# Patient Record
Sex: FEMALE
[De-identification: names, ages, dates, MRNs, and addresses within clinical notes are randomized per-mention and may not be internally consistent; named-entity substitution may affect disease eponyms.]

---

## 2017-02-06 ENCOUNTER — APPOINTMENT (OUTPATIENT)
Dept: PULMONOLOGY | Facility: CLINIC | Age: 77
End: 2017-02-06

## 2017-02-07 VITALS
DIASTOLIC BLOOD PRESSURE: 76 MMHG | WEIGHT: 175 LBS | RESPIRATION RATE: 16 BRPM | SYSTOLIC BLOOD PRESSURE: 120 MMHG | BODY MASS INDEX: 34.36 KG/M2 | HEART RATE: 78 BPM | OXYGEN SATURATION: 94 % | HEIGHT: 60 IN

## 2017-08-14 ENCOUNTER — APPOINTMENT (OUTPATIENT)
Dept: PULMONOLOGY | Facility: CLINIC | Age: 77
End: 2017-08-14

## 2019-06-11 ENCOUNTER — RX RENEWAL (OUTPATIENT)
Age: 79
End: 2019-06-11

## 2019-06-11 RX ORDER — ATORVASTATIN CALCIUM 10 MG/1
10 TABLET, FILM COATED ORAL
Qty: 30 | Refills: 0 | Status: ACTIVE | COMMUNITY
Start: 2019-06-11 | End: 1900-01-01

## 2019-06-11 RX ORDER — PANTOPRAZOLE 40 MG/1
40 TABLET, DELAYED RELEASE ORAL
Qty: 30 | Refills: 0 | Status: ACTIVE | COMMUNITY
Start: 2019-06-11 | End: 1900-01-01

## 2019-07-29 ENCOUNTER — NON-APPOINTMENT (OUTPATIENT)
Age: 79
End: 2019-07-29

## 2019-07-29 ENCOUNTER — APPOINTMENT (OUTPATIENT)
Dept: INTERNAL MEDICINE | Facility: CLINIC | Age: 79
End: 2019-07-29
Payer: MEDICARE

## 2019-07-29 VITALS
DIASTOLIC BLOOD PRESSURE: 80 MMHG | SYSTOLIC BLOOD PRESSURE: 130 MMHG | TEMPERATURE: 98.3 F | RESPIRATION RATE: 14 BRPM | BODY MASS INDEX: 34.55 KG/M2 | HEART RATE: 126 BPM | OXYGEN SATURATION: 96 % | WEIGHT: 176 LBS | HEIGHT: 60 IN

## 2019-07-29 DIAGNOSIS — Z00.00 ENCOUNTER FOR GENERAL ADULT MEDICAL EXAMINATION W/OUT ABNORMAL FINDINGS: ICD-10-CM

## 2019-07-29 PROCEDURE — 36415 COLL VENOUS BLD VENIPUNCTURE: CPT

## 2019-07-29 PROCEDURE — G0439: CPT

## 2019-07-29 PROCEDURE — 93000 ELECTROCARDIOGRAM COMPLETE: CPT

## 2019-07-29 RX ORDER — CHLORHEXIDINE GLUCONATE 4 %
325 (65 FE) LIQUID (ML) TOPICAL TWICE DAILY
Qty: 60 | Refills: 3 | Status: ACTIVE | COMMUNITY
Start: 2019-07-29 | End: 1900-01-01

## 2019-07-29 RX ORDER — ARIPIPRAZOLE 10 MG/1
10 TABLET ORAL DAILY
Qty: 30 | Refills: 3 | Status: ACTIVE | COMMUNITY
Start: 2019-07-29 | End: 1900-01-01

## 2019-07-29 RX ORDER — ALBUTEROL SULFATE 90 UG/1
108 (90 BASE) AEROSOL, METERED RESPIRATORY (INHALATION)
Qty: 1 | Refills: 3 | Status: ACTIVE | COMMUNITY
Start: 2019-07-29 | End: 1900-01-01

## 2019-07-30 LAB
ALBUMIN SERPL ELPH-MCNC: 4.2 G/DL
ALP BLD-CCNC: 97 U/L
ALT SERPL-CCNC: 10 U/L
ANION GAP SERPL CALC-SCNC: 13 MMOL/L
APPEARANCE: ABNORMAL
AST SERPL-CCNC: 13 U/L
BACTERIA: ABNORMAL
BILIRUB SERPL-MCNC: 0.3 MG/DL
BILIRUBIN URINE: NEGATIVE
BLOOD URINE: NEGATIVE
BUN SERPL-MCNC: 20 MG/DL
CALCIUM SERPL-MCNC: 9.8 MG/DL
CHLORIDE SERPL-SCNC: 107 MMOL/L
CHOLEST SERPL-MCNC: 185 MG/DL
CHOLEST/HDLC SERPL: 3.9 RATIO
CO2 SERPL-SCNC: 25 MMOL/L
COLOR: YELLOW
CREAT SERPL-MCNC: 1.44 MG/DL
ESTIMATED AVERAGE GLUCOSE: 128 MG/DL
GLUCOSE QUALITATIVE U: NEGATIVE
GLUCOSE SERPL-MCNC: 135 MG/DL
HBA1C MFR BLD HPLC: 6.1 %
HDLC SERPL-MCNC: 47 MG/DL
HYALINE CASTS: 0 /LPF
KETONES URINE: NEGATIVE
LDLC SERPL CALC-MCNC: 93 MG/DL
LEUKOCYTE ESTERASE URINE: ABNORMAL
LITHIUM SERPL-SCNC: <0.2 MMOL/L
MICROSCOPIC-UA: NORMAL
NITRITE URINE: NEGATIVE
PH URINE: 5.5
POTASSIUM SERPL-SCNC: 5.1 MMOL/L
PROT SERPL-MCNC: 7.3 G/DL
PROTEIN URINE: ABNORMAL
RED BLOOD CELLS URINE: 7 /HPF
SODIUM SERPL-SCNC: 145 MMOL/L
SPECIFIC GRAVITY URINE: 1.02
SQUAMOUS EPITHELIAL CELLS: 4 /HPF
T3 SERPL-MCNC: 122 NG/DL
T4 FREE SERPL-MCNC: 1 NG/DL
T4 SERPL-MCNC: 6.6 UG/DL
TRIGL SERPL-MCNC: 227 MG/DL
TSH SERPL-ACNC: 2.93 UIU/ML
UROBILINOGEN URINE: NORMAL
WHITE BLOOD CELLS URINE: 62 /HPF

## 2019-08-03 NOTE — PHYSICAL EXAM
[No Acute Distress] : no acute distress [Well Nourished] : well nourished [Well Developed] : well developed [Well-Appearing] : well-appearing [Normal Sclera/Conjunctiva] : normal sclera/conjunctiva [PERRL] : pupils equal round and reactive to light [EOMI] : extraocular movements intact [Normal Outer Ear/Nose] : the outer ears and nose were normal in appearance [Normal Oropharynx] : the oropharynx was normal [No JVD] : no jugular venous distention [No Lymphadenopathy] : no lymphadenopathy [Supple] : supple [Thyroid Normal, No Nodules] : the thyroid was normal and there were no nodules present [No Respiratory Distress] : no respiratory distress  [No Accessory Muscle Use] : no accessory muscle use [Clear to Auscultation] : lungs were clear to auscultation bilaterally [Normal Rate] : normal rate  [Regular Rhythm] : with a regular rhythm [Normal S1, S2] : normal S1 and S2 [No Murmur] : no murmur heard [No Carotid Bruits] : no carotid bruits [No Abdominal Bruit] : a ~M bruit was not heard ~T in the abdomen [No Varicosities] : no varicosities [Pedal Pulses Present] : the pedal pulses are present [No Edema] : there was no peripheral edema [No Palpable Aorta] : no palpable aorta [No Extremity Clubbing/Cyanosis] : no extremity clubbing/cyanosis [Normal Appearance] : normal in appearance [No Nipple Discharge] : no nipple discharge [No Axillary Lymphadenopathy] : no axillary lymphadenopathy [Soft] : abdomen soft [Non Tender] : non-tender [Non-distended] : non-distended [No Masses] : no abdominal mass palpated [No HSM] : no HSM [Normal Bowel Sounds] : normal bowel sounds [Normal Posterior Cervical Nodes] : no posterior cervical lymphadenopathy [Normal Anterior Cervical Nodes] : no anterior cervical lymphadenopathy [No CVA Tenderness] : no CVA  tenderness [No Spinal Tenderness] : no spinal tenderness [No Joint Swelling] : no joint swelling [Grossly Normal Strength/Tone] : grossly normal strength/tone [No Rash] : no rash [Coordination Grossly Intact] : coordination grossly intact [No Focal Deficits] : no focal deficits [Normal Gait] : normal gait [Deep Tendon Reflexes (DTR)] : deep tendon reflexes were 2+ and symmetric [Normal Affect] : the affect was normal [Normal Insight/Judgement] : insight and judgment were intact [FreeTextEntry1] : DEFERRED

## 2019-08-03 NOTE — HEALTH RISK ASSESSMENT
[Fair] :  ~his/her~ mood as fair [No] : In the past 12 months have you used drugs other than those required for medical reasons? No [No falls in past year] : Patient reported no falls in the past year [1] : 1) Little interest or pleasure doing things for several days (1) [0] : 2) Feeling down, depressed, or hopeless: Not at all (0) [] : No

## 2019-08-03 NOTE — HISTORY OF PRESENT ILLNESS
[FreeTextEntry1] : PHYSICAL\par NO SPECIFIC COMPLAINTS [de-identified] : 79 YO WF WITH HX OF  CAD, HTN, HYPERLIPIDEMIA, ASTHMA, DEPRESSION CAME TO THE OFFICE FOR PHYSICAL

## 2019-08-09 ENCOUNTER — OTHER (OUTPATIENT)
Age: 79
End: 2019-08-09

## 2019-08-09 DIAGNOSIS — R82.90 UNSPECIFIED ABNORMAL FINDINGS IN URINE: ICD-10-CM

## 2019-08-09 LAB
BASOPHILS # BLD AUTO: 0.07 K/UL
BASOPHILS NFR BLD AUTO: 0.7 %
EOSINOPHIL # BLD AUTO: 0.25 K/UL
EOSINOPHIL NFR BLD AUTO: 2.7 %
HCT VFR BLD CALC: 43.8 %
HGB BLD-MCNC: 13 G/DL
IMM GRANULOCYTES NFR BLD AUTO: 0.4 %
LYMPHOCYTES # BLD AUTO: 1.73 K/UL
LYMPHOCYTES NFR BLD AUTO: 18.3 %
MAN DIFF?: NORMAL
MCHC RBC-ENTMCNC: 27.4 PG
MCHC RBC-ENTMCNC: 29.7 GM/DL
MCV RBC AUTO: 92.4 FL
MONOCYTES # BLD AUTO: 0.48 K/UL
MONOCYTES NFR BLD AUTO: 5.1 %
NEUTROPHILS # BLD AUTO: 6.86 K/UL
NEUTROPHILS NFR BLD AUTO: 72.8 %
PLATELET # BLD AUTO: 283 K/UL
RBC # BLD: 4.74 M/UL
RBC # FLD: 14.8 %
WBC # FLD AUTO: 9.43 K/UL

## 2019-08-15 ENCOUNTER — OTHER (OUTPATIENT)
Age: 79
End: 2019-08-15

## 2019-08-15 DIAGNOSIS — Z87.440 PERSONAL HISTORY OF URINARY (TRACT) INFECTIONS: ICD-10-CM

## 2019-08-15 LAB
APPEARANCE: ABNORMAL
BACTERIA UR CULT: ABNORMAL
BACTERIA: ABNORMAL
BILIRUBIN URINE: NEGATIVE
BLOOD URINE: NEGATIVE
COLOR: YELLOW
GLUCOSE QUALITATIVE U: NEGATIVE
HYALINE CASTS: 0 /LPF
KETONES URINE: NEGATIVE
LEUKOCYTE ESTERASE URINE: ABNORMAL
MICROSCOPIC-UA: NORMAL
NITRITE URINE: POSITIVE
PH URINE: 6.5
PROTEIN URINE: NORMAL
RED BLOOD CELLS URINE: 8 /HPF
SPECIFIC GRAVITY URINE: 1.02
SQUAMOUS EPITHELIAL CELLS: 7 /HPF
UROBILINOGEN URINE: NORMAL
WHITE BLOOD CELLS URINE: 92 /HPF

## 2019-08-15 RX ORDER — NITROFURANTOIN (MONOHYDRATE/MACROCRYSTALS) 25; 75 MG/1; MG/1
100 CAPSULE ORAL TWICE DAILY
Qty: 14 | Refills: 0 | Status: ACTIVE | COMMUNITY
Start: 2019-08-15 | End: 1900-01-01

## 2019-12-02 ENCOUNTER — RX RENEWAL (OUTPATIENT)
Age: 79
End: 2019-12-02

## 2019-12-02 RX ORDER — PAROXETINE HYDROCHLORIDE 20 MG/1
20 TABLET, FILM COATED ORAL DAILY
Qty: 30 | Refills: 0 | Status: ACTIVE | COMMUNITY
Start: 2019-07-29 | End: 1900-01-01

## 2019-12-03 ENCOUNTER — RX RENEWAL (OUTPATIENT)
Age: 79
End: 2019-12-03

## 2019-12-30 ENCOUNTER — RX RENEWAL (OUTPATIENT)
Age: 79
End: 2019-12-30

## 2020-05-28 ENCOUNTER — APPOINTMENT (OUTPATIENT)
Dept: INTERNAL MEDICINE | Facility: CLINIC | Age: 80
End: 2020-05-28

## 2020-11-06 ENCOUNTER — APPOINTMENT (OUTPATIENT)
Dept: INTERNAL MEDICINE | Facility: CLINIC | Age: 80
End: 2020-11-06
Payer: MEDICARE

## 2020-11-06 VITALS
WEIGHT: 173 LBS | RESPIRATION RATE: 16 BRPM | HEIGHT: 60 IN | OXYGEN SATURATION: 91 % | SYSTOLIC BLOOD PRESSURE: 135 MMHG | BODY MASS INDEX: 33.96 KG/M2 | TEMPERATURE: 98.7 F | HEART RATE: 100 BPM | DIASTOLIC BLOOD PRESSURE: 74 MMHG

## 2020-11-06 DIAGNOSIS — Z23 ENCOUNTER FOR IMMUNIZATION: ICD-10-CM

## 2020-11-06 PROCEDURE — G0008: CPT

## 2020-11-06 PROCEDURE — 93000 ELECTROCARDIOGRAM COMPLETE: CPT | Mod: 59

## 2020-11-06 PROCEDURE — 90686 IIV4 VACC NO PRSV 0.5 ML IM: CPT

## 2020-11-06 PROCEDURE — 99214 OFFICE O/P EST MOD 30 MIN: CPT | Mod: 25

## 2020-11-06 PROCEDURE — 36415 COLL VENOUS BLD VENIPUNCTURE: CPT

## 2020-11-08 NOTE — HISTORY OF PRESENT ILLNESS
[FreeTextEntry1] : Follow up  [de-identified] : DIAZ is a 80 year old female with a PMHx of COPD, anemia, dyslipidemia, HTN, depression, and anxiety  who presents to the office for follow up. Patient does not feel well; she has SOB on exertion and cannot walk well. \par \par Patient requests H. Influenza vaccination.

## 2020-11-08 NOTE — HEALTH RISK ASSESSMENT
[Never (0 pts)] : Never (0 points) [No] : In the past 12 months have you used drugs other than those required for medical reasons? No [No falls in past year] : Patient reported no falls in the past year [0] : 2) Feeling down, depressed, or hopeless: Not at all (0) [] : No [Audit-CScore] : 0 [de-identified] : sedentary [de-identified] : regular [[id number]] : 0

## 2020-11-08 NOTE — PLAN
[FreeTextEntry1] : Blood tests sent to the lab\par \par Continue same medications\par \par EKG\par \par Cardiology and pulmonary evaluations \par \par H. Influenza vaccination administered today

## 2020-11-08 NOTE — PHYSICAL EXAM
[No Acute Distress] : no acute distress [Well Nourished] : well nourished [Well Developed] : well developed [Well-Appearing] : well-appearing [Normal Sclera/Conjunctiva] : normal sclera/conjunctiva [PERRL] : pupils equal round and reactive to light [EOMI] : extraocular movements intact [Normal Outer Ear/Nose] : the outer ears and nose were normal in appearance [Normal Oropharynx] : the oropharynx was normal [No JVD] : no jugular venous distention [No Lymphadenopathy] : no lymphadenopathy [Supple] : supple [Thyroid Normal, No Nodules] : the thyroid was normal and there were no nodules present [No Respiratory Distress] : no respiratory distress  [No Accessory Muscle Use] : no accessory muscle use [Clear to Auscultation] : lungs were clear to auscultation bilaterally [Normal Rate] : normal rate  [Regular Rhythm] : with a regular rhythm [Normal S1, S2] : normal S1 and S2 [No Murmur] : no murmur heard [No Carotid Bruits] : no carotid bruits [No Abdominal Bruit] : a ~M bruit was not heard ~T in the abdomen [No Varicosities] : no varicosities [Pedal Pulses Present] : the pedal pulses are present [No Edema] : there was no peripheral edema [No Palpable Aorta] : no palpable aorta [No Extremity Clubbing/Cyanosis] : no extremity clubbing/cyanosis [Normal Appearance] : normal in appearance [No Nipple Discharge] : no nipple discharge [No Axillary Lymphadenopathy] : no axillary lymphadenopathy [Soft] : abdomen soft [Non Tender] : non-tender [Non-distended] : non-distended [No Masses] : no abdominal mass palpated [No HSM] : no HSM [Normal Bowel Sounds] : normal bowel sounds [Normal Posterior Cervical Nodes] : no posterior cervical lymphadenopathy [Normal Anterior Cervical Nodes] : no anterior cervical lymphadenopathy [No CVA Tenderness] : no CVA  tenderness [No Spinal Tenderness] : no spinal tenderness [No Joint Swelling] : no joint swelling [Grossly Normal Strength/Tone] : grossly normal strength/tone [No Rash] : no rash [Coordination Grossly Intact] : coordination grossly intact [Normal Gait] : normal gait [Normal Affect] : the affect was normal [Normal Insight/Judgement] : insight and judgment were intact [de-identified] : obese [FreeTextEntry1] : deferred [de-identified] : resting tremors left hand

## 2020-11-08 NOTE — REVIEW OF SYSTEMS
[Dyspnea on Exertion] : dyspnea on exertion [Negative] : Heme/Lymph [FreeTextEntry2] : malaise [FreeTextEntry9] : difficulty walking

## 2020-11-10 ENCOUNTER — APPOINTMENT (OUTPATIENT)
Dept: HEART AND VASCULAR | Facility: CLINIC | Age: 80
End: 2020-11-10
Payer: MEDICARE

## 2020-11-10 ENCOUNTER — APPOINTMENT (OUTPATIENT)
Dept: PULMONOLOGY | Facility: CLINIC | Age: 80
End: 2020-11-10
Payer: MEDICARE

## 2020-11-10 VITALS
RESPIRATION RATE: 14 BRPM | BODY MASS INDEX: 33.96 KG/M2 | WEIGHT: 173 LBS | DIASTOLIC BLOOD PRESSURE: 85 MMHG | HEIGHT: 60 IN | SYSTOLIC BLOOD PRESSURE: 138 MMHG | TEMPERATURE: 98.1 F | HEART RATE: 85 BPM | OXYGEN SATURATION: 93 %

## 2020-11-10 PROCEDURE — 93306 TTE W/DOPPLER COMPLETE: CPT

## 2020-11-10 PROCEDURE — 99203 OFFICE O/P NEW LOW 30 MIN: CPT

## 2020-11-10 RX ORDER — FLUTICASONE FUROATE, UMECLIDINIUM BROMIDE AND VILANTEROL TRIFENATATE 100; 62.5; 25 UG/1; UG/1; UG/1
100-62.5-25 POWDER RESPIRATORY (INHALATION) DAILY
Qty: 1 | Refills: 3 | Status: ACTIVE | COMMUNITY
Start: 2020-11-10 | End: 1900-01-01

## 2020-11-10 NOTE — PHYSICAL EXAM
[No Acute Distress] : no acute distress [Normal Oropharynx] : normal oropharynx [Normal Appearance] : normal appearance [No Neck Mass] : no neck mass [Normal Rate/Rhythm] : normal rate/rhythm [Normal S1, S2] : normal s1, s2 [No Murmurs] : no murmurs [No Resp Distress] : no resp distress [Rhonchi] : rhonchi [No Abnormalities] : no abnormalities [Benign] : benign [Normal Gait] : normal gait [No Clubbing] : no clubbing [No Cyanosis] : no cyanosis [No Edema] : no edema [FROM] : FROM [Normal Color/ Pigmentation] : normal color/ pigmentation [No Focal Deficits] : no focal deficits [Oriented x3] : oriented x3 [Normal Affect] : normal affect

## 2020-11-10 NOTE — ASSESSMENT
[FreeTextEntry1] : Summary the patient is an 80-year-old female with past medical history significant for hypertension, anxiety, COPD on home oxygen who presents for pulmonary consult.  The patient's physical exam is significant for scattered rhonchi bilaterally.  The patient did a 6-minute walk test and desaturated almost immediately.  She started off at rest with oxygen saturation of 92% and decreased to 80 with ambulation.  The patient was subsequently placed on 4 L nasal cannula with an oxygen saturation of 90% and therefore the patient qualifies for home oxygen.  The patient is now started on Trelegy and is instructed to follow-up in 1 month

## 2020-11-10 NOTE — HISTORY OF PRESENT ILLNESS
[Former] : former [Never] : never [TextBox_4] : Patient is an 80-year-old female with past medical history significant for anxiety, hypertension, COPD who is referred today for pulmonary consult.  The patient states that she is unable to ambulate more than 10 feet before becoming dyspneic.  She previously had home oxygen.  She states that she is compliant with her medications although she ran out of bronchodilator therapy a long time ago.  She denies any recent fevers chills chest pain weight loss or hemoptysis

## 2020-11-10 NOTE — REVIEW OF SYSTEMS
[Cough] : cough [Hemoptysis] : no hemoptysis [Chest Tightness] : no chest tightness [Frequent URIs] : no frequent URIs [Sputum] : no sputum [Dyspnea] : dyspnea [Pleuritic Pain] : no pleuritic pain [Wheezing] : wheezing [A.M. Dry Mouth] : a.m. dry mouth [SOB on Exertion] : sob on exertion [Negative] : Endocrine

## 2020-11-10 NOTE — REASON FOR VISIT
[Consultation] : a consultation [COPD] : COPD [Edema] : edema [Shortness of Breath] : shortness of breath

## 2020-11-17 ENCOUNTER — NON-APPOINTMENT (OUTPATIENT)
Age: 80
End: 2020-11-17

## 2020-11-17 ENCOUNTER — APPOINTMENT (OUTPATIENT)
Dept: HEART AND VASCULAR | Facility: CLINIC | Age: 80
End: 2020-11-17
Payer: MEDICARE

## 2020-11-17 VITALS
SYSTOLIC BLOOD PRESSURE: 140 MMHG | WEIGHT: 174 LBS | RESPIRATION RATE: 14 BRPM | BODY MASS INDEX: 34.16 KG/M2 | DIASTOLIC BLOOD PRESSURE: 80 MMHG | TEMPERATURE: 97.1 F | HEART RATE: 99 BPM | OXYGEN SATURATION: 92 % | HEIGHT: 60 IN

## 2020-11-17 PROCEDURE — 99204 OFFICE O/P NEW MOD 45 MIN: CPT

## 2020-11-17 NOTE — ASSESSMENT
[FreeTextEntry1] : 80-year-old female with past medical history of anxiety, hypertension, COPD, CAD status post PCI, obesity here for first evaluation\par \par CAD s/p PCI\par -Please obtain records on previous cardiac history.\par -In the setting of  CAD status post PCI and a dyspnea on exertion, will order a nuclear stress test to r/o ischemia.\par -Continue aspirin 81 mg daily\par -continue with clopidogrel 75 mg daily (assess need to continue it) \par -continue atorvastatin 10 mg daily\par -Continue with the Toprol succinate 25 mg daily\par continue with BP control\par -echo wnl\par \par HTN\par -well controlled \par -continue with current medications\par -Continue with lisinopril/hydrochlorothiazide to (10/12.5 mg daily)\par -Continue with the Toprol-XL 25 mg daily\par -cmp wnl\par \par HLD\par -cont with atorvastatin 10 mg daily \par \par Followup in 3 weeks for nuc stress test results

## 2020-11-17 NOTE — PHYSICAL EXAM
[FreeTextEntry1] : obese, nad, walks with a cane  [Normal Oral Mucosa] : normal oral mucosa [No Oral Pallor] : no oral pallor [No Oral Cyanosis] : no oral cyanosis [Normal Jugular Venous A Waves Present] : normal jugular venous A waves present [Normal Jugular Venous V Waves Present] : normal jugular venous V waves present [No Jugular Venous Sahni A Waves] : no jugular venous sahni A waves [Heart Rate And Rhythm] : heart rate and rhythm were normal [Heart Sounds] : normal S1 and S2 [Murmurs] : no murmurs present [Respiration, Rhythm And Depth] : normal respiratory rhythm and effort [Exaggerated Use Of Accessory Muscles For Inspiration] : no accessory muscle use [Auscultation Breath Sounds / Voice Sounds] : lungs were clear to auscultation bilaterally [Abnormal Walk] : normal gait [Gait - Sufficient For Exercise Testing] : the gait was sufficient for exercise testing [Nail Clubbing] : no clubbing of the fingernails [Cyanosis, Localized] : no localized cyanosis [Petechial Hemorrhages (___cm)] : no petechial hemorrhages [] : no ischemic changes

## 2020-11-17 NOTE — HISTORY OF PRESENT ILLNESS
[FreeTextEntry1] : 80-year-old female with past medical history of anxiety, hypertension, COPD, CAD status post PCI, obesity here for first evaluation\par The patient reports significant dyspnea on exertion. She can walk less than one block limited by back pain and dyspnea on exertion.\par She denies any chest pain, shortness of breath, palpitations, syncope, presyncope. She was previously followed by a cardiologist many years ago but cannot provide any information her past cardiac history.\par No documentation is  currently available in her past cardiac history.\par The patient is severely limited in her ambulation 2/2  knee pain.\par \par Echocardiogram in 7/10/020\par Left ventricular ejection fraction is 60-65%\par Normal left ventricle size and function\par Mild LVH\par Mild AR\par \par \par EKG 7/6/2020\par Sinus rhythm, within normal limits\par \par LABS 11/6/2020\par Triglycerides 132\par Cholesterol 147\par HDL 57\par LDL 64\par creat 1.1

## 2020-11-23 LAB
25(OH)D3 SERPL-MCNC: 34.7 NG/ML
ALBUMIN SERPL ELPH-MCNC: 4 G/DL
ALP BLD-CCNC: 81 U/L
ALT SERPL-CCNC: 11 U/L
ANION GAP SERPL CALC-SCNC: 14 MMOL/L
APPEARANCE: ABNORMAL
AST SERPL-CCNC: 16 U/L
BACTERIA: ABNORMAL
BASOPHILS # BLD AUTO: 0.07 K/UL
BASOPHILS NFR BLD AUTO: 0.6 %
BILIRUB SERPL-MCNC: 0.2 MG/DL
BILIRUBIN URINE: NEGATIVE
BLOOD URINE: NEGATIVE
BUN SERPL-MCNC: 22 MG/DL
CALCIUM SERPL-MCNC: 9.6 MG/DL
CHLORIDE SERPL-SCNC: 103 MMOL/L
CHOLEST SERPL-MCNC: 147 MG/DL
CO2 SERPL-SCNC: 26 MMOL/L
COLOR: YELLOW
CREAT SERPL-MCNC: 1.16 MG/DL
EOSINOPHIL # BLD AUTO: 0.32 K/UL
EOSINOPHIL NFR BLD AUTO: 2.8 %
FERRITIN SERPL-MCNC: 293 NG/ML
FOLATE SERPL-MCNC: >20 NG/ML
GLUCOSE QUALITATIVE U: NEGATIVE
GLUCOSE SERPL-MCNC: 108 MG/DL
HCT VFR BLD CALC: 37.2 %
HDLC SERPL-MCNC: 57 MG/DL
HGB BLD-MCNC: 11 G/DL
HYALINE CASTS: 1 /LPF
IMM GRANULOCYTES NFR BLD AUTO: 0.5 %
IRON SATN MFR SERPL: 18 %
IRON SERPL-MCNC: 42 UG/DL
KETONES URINE: NEGATIVE
LDLC SERPL CALC-MCNC: 64 MG/DL
LEUKOCYTE ESTERASE URINE: NEGATIVE
LYMPHOCYTES # BLD AUTO: 2.21 K/UL
LYMPHOCYTES NFR BLD AUTO: 19.6 %
MAN DIFF?: NORMAL
MCHC RBC-ENTMCNC: 27.7 PG
MCHC RBC-ENTMCNC: 29.6 GM/DL
MCV RBC AUTO: 93.7 FL
MICROSCOPIC-UA: NORMAL
MONOCYTES # BLD AUTO: 0.64 K/UL
MONOCYTES NFR BLD AUTO: 5.7 %
NEUTROPHILS # BLD AUTO: 7.96 K/UL
NEUTROPHILS NFR BLD AUTO: 70.8 %
NITRITE URINE: POSITIVE
NONHDLC SERPL-MCNC: 90 MG/DL
PH URINE: 5.5
PLATELET # BLD AUTO: 248 K/UL
POTASSIUM SERPL-SCNC: 4.6 MMOL/L
PROT SERPL-MCNC: 7.2 G/DL
PROTEIN URINE: NEGATIVE
RBC # BLD: 3.97 M/UL
RBC # FLD: 14 %
RED BLOOD CELLS URINE: 1 /HPF
SODIUM SERPL-SCNC: 143 MMOL/L
SPECIFIC GRAVITY URINE: 1.02
SQUAMOUS EPITHELIAL CELLS: 2 /HPF
T3 SERPL-MCNC: 107 NG/DL
T4 FREE SERPL-MCNC: 0.9 NG/DL
T4 SERPL-MCNC: 6.4 UG/DL
TIBC SERPL-MCNC: 239 UG/DL
TRIGL SERPL-MCNC: 132 MG/DL
TSH SERPL-ACNC: 4.22 UIU/ML
UIBC SERPL-MCNC: 197 UG/DL
UROBILINOGEN URINE: NORMAL
VIT B12 SERPL-MCNC: 1154 PG/ML
WBC # FLD AUTO: 11.26 K/UL
WHITE BLOOD CELLS URINE: 8 /HPF

## 2020-11-25 ENCOUNTER — APPOINTMENT (OUTPATIENT)
Dept: CARDIOLOGY | Facility: CLINIC | Age: 80
End: 2020-11-25
Payer: MEDICARE

## 2020-11-25 PROCEDURE — 78451 HT MUSCLE IMAGE SPECT SING: CPT

## 2020-11-25 PROCEDURE — A9500: CPT

## 2020-12-08 ENCOUNTER — APPOINTMENT (OUTPATIENT)
Dept: HEART AND VASCULAR | Facility: CLINIC | Age: 80
End: 2020-12-08
Payer: MEDICARE

## 2020-12-08 VITALS
WEIGHT: 174 LBS | OXYGEN SATURATION: 93 % | SYSTOLIC BLOOD PRESSURE: 154 MMHG | HEIGHT: 60 IN | HEART RATE: 94 BPM | RESPIRATION RATE: 16 BRPM | DIASTOLIC BLOOD PRESSURE: 80 MMHG | TEMPERATURE: 99.3 F

## 2020-12-08 PROCEDURE — 99214 OFFICE O/P EST MOD 30 MIN: CPT

## 2020-12-08 NOTE — ASSESSMENT
[FreeTextEntry1] : 80-year-old female with past medical history of anxiety, hypertension, COPD, CAD status post PCI, obesity here for follow up \par \par CAD s/p PCI\par -In the setting of  CAD status post PCI and a dyspnea on exertion the patient was scheduled for a nuclear stress test to r/o ischemia but the test could not be performed 2/2 patient's hypoxia. only resting images were obtained\par -today the patient is adamant about not having a cardiac catheterization\par -She was explained at length the risk and benefits of cardiac catheterization, and she verbalized understanding, declining cardiac cath \par -reccomended to call the clinic immediately if onset of chest pain\par -Continue aspirin 81 mg daily\par -continue with clopidogrel 75 mg daily  \par -continue atorvastatin 10 mg daily\par -increase Toprol succinate 25 mg daily to 50 mg daily in the setting of not optimally controlled BP today\par -echo wnl\par \par HTN\par -Not optimally controlled today\par -Increase Toprol to 50 mg daily as above\par -Continue with lisinopril/hydrochlorothiazide to (10/12.5 mg daily)\par -recc to monitor BP at home and call the clinic if BP> 140/90\par -cmp wnl\par \par HLD\par -cont with atorvastatin 10 mg daily \par \par Followup in 3 months or sooner if any symptoms

## 2020-12-08 NOTE — HISTORY OF PRESENT ILLNESS
[FreeTextEntry1] : 80-year-old female with past medical history of anxiety, hypertension, COPD, CAD status post PCI, obesity here for follow up\par The patient was scheduled for a nuclear stress test to that she could not complete the exercise part due to severe hypoxia.\par Today she is here for followup and is adamant about not having a cardiac catheterization done.\par She denies chest pain, reports chronic shortness of breath. She walks slowly with a cane for a few blocks.\par She reports compliance with her medications.\par \par Echocardiogram in 7/10/020\par Left ventricular ejection fraction is 60-65%\par Normal left ventricle size and function\par Mild LVH\par Mild AR\par \par \par EKG 7/6/2020\par Sinus rhythm, within normal limits\par \par LABS 11/6/2020\par Triglycerides 132\par Cholesterol 147\par HDL 57\par LDL 64\par creat 1.1\par \par Nuclear stress test 11/25/02020\par medium, mild defect of the basal to mid inferior and basal to mid inferolateral wall\par no stress imaging\par

## 2020-12-15 ENCOUNTER — APPOINTMENT (OUTPATIENT)
Dept: PULMONOLOGY | Facility: CLINIC | Age: 80
End: 2020-12-15
Payer: MEDICARE

## 2020-12-15 VITALS
TEMPERATURE: 98.9 F | OXYGEN SATURATION: 88 % | BODY MASS INDEX: 34.16 KG/M2 | SYSTOLIC BLOOD PRESSURE: 137 MMHG | HEART RATE: 88 BPM | DIASTOLIC BLOOD PRESSURE: 86 MMHG | WEIGHT: 174 LBS | RESPIRATION RATE: 14 BRPM | HEIGHT: 60 IN

## 2020-12-15 PROCEDURE — 99214 OFFICE O/P EST MOD 30 MIN: CPT

## 2020-12-15 NOTE — HISTORY OF PRESENT ILLNESS
[TextBox_4] : Patient is an 80-year-old female past medical history significant for anxiety, hypertension, COPD, obesity, congestive heart failure who presents today for follow-up.  The patient states that her cough shortness of breath and dyspnea on exertion have improved with Trelegy.  She still continues to complain of symptomatic shortness of breath and the inability to walk a half a city block

## 2020-12-15 NOTE — ASSESSMENT
[FreeTextEntry1] : Summary the patient is an 80-year-old female with past medical history significant for hypertension, anxiety, COPD on home oxygen who presents for pulmonary consult.  The patient's physical exam is significant for scattered rhonchi bilaterally.  Patient is instructed to continue Trelegy.  She qualifies for home oxygen paperwork filled out.  Patient instructed to follow-up in 3 months

## 2020-12-21 PROBLEM — Z87.440 HISTORY OF URINARY TRACT INFECTION: Status: RESOLVED | Noted: 2019-08-15 | Resolved: 2020-12-21

## 2021-01-16 ENCOUNTER — RX RENEWAL (OUTPATIENT)
Age: 81
End: 2021-01-16

## 2021-01-19 ENCOUNTER — RX RENEWAL (OUTPATIENT)
Age: 81
End: 2021-01-19

## 2021-01-25 ENCOUNTER — RX RENEWAL (OUTPATIENT)
Age: 81
End: 2021-01-25

## 2021-01-25 RX ORDER — CLOPIDOGREL BISULFATE 75 MG/1
75 TABLET, FILM COATED ORAL DAILY
Qty: 90 | Refills: 3 | Status: ACTIVE | COMMUNITY
Start: 2019-06-11 | End: 1900-01-01

## 2021-02-05 ENCOUNTER — RX RENEWAL (OUTPATIENT)
Age: 81
End: 2021-02-05

## 2021-03-02 ENCOUNTER — APPOINTMENT (OUTPATIENT)
Dept: HEART AND VASCULAR | Facility: CLINIC | Age: 81
End: 2021-03-02

## 2021-03-09 ENCOUNTER — APPOINTMENT (OUTPATIENT)
Dept: INTERNAL MEDICINE | Facility: CLINIC | Age: 81
End: 2021-03-09
Payer: MEDICARE

## 2021-03-09 ENCOUNTER — APPOINTMENT (OUTPATIENT)
Dept: PULMONOLOGY | Facility: CLINIC | Age: 81
End: 2021-03-09
Payer: MEDICARE

## 2021-03-09 VITALS
WEIGHT: 175 LBS | TEMPERATURE: 98.3 F | RESPIRATION RATE: 14 BRPM | HEIGHT: 60 IN | DIASTOLIC BLOOD PRESSURE: 92 MMHG | SYSTOLIC BLOOD PRESSURE: 159 MMHG | BODY MASS INDEX: 34.36 KG/M2 | HEART RATE: 100 BPM | OXYGEN SATURATION: 93 %

## 2021-03-09 DIAGNOSIS — Z86.39 PERSONAL HISTORY OF OTHER ENDOCRINE, NUTRITIONAL AND METABOLIC DISEASE: ICD-10-CM

## 2021-03-09 DIAGNOSIS — Z87.891 PERSONAL HISTORY OF NICOTINE DEPENDENCE: ICD-10-CM

## 2021-03-09 DIAGNOSIS — R06.02 SHORTNESS OF BREATH: ICD-10-CM

## 2021-03-09 DIAGNOSIS — Z99.81 DEPENDENCE ON SUPPLEMENTAL OXYGEN: ICD-10-CM

## 2021-03-09 PROCEDURE — 36415 COLL VENOUS BLD VENIPUNCTURE: CPT

## 2021-03-09 PROCEDURE — 99214 OFFICE O/P EST MOD 30 MIN: CPT

## 2021-03-09 PROCEDURE — 99214 OFFICE O/P EST MOD 30 MIN: CPT | Mod: 25

## 2021-03-09 RX ORDER — MECLIZINE HYDROCHLORIDE 12.5 MG/1
12.5 TABLET ORAL
Qty: 20 | Refills: 2 | Status: ACTIVE | COMMUNITY
Start: 2021-03-09 | End: 1900-01-01

## 2021-03-09 NOTE — ASSESSMENT
[FreeTextEntry1] : Summary the patient is an 80-year-old female with past medical history significant for hypertension, anxiety, COPD on home oxygen who presents for pulmonary follow up. The patient's physical exam is significant for scattered rhonchi bilaterally.  Patient is instructed to continue Trelegy.  Patient has not received her home oxygen and does not want it. Patient instructed to follow up in 3 months

## 2021-03-09 NOTE — HISTORY OF PRESENT ILLNESS
[Former] : former [Never] : never [TextBox_4] : Patient is an 80 year old female with past medical history significant for COPD , anxiety, GERD, high cholesterol, hypertension who presents today for follow up. Patient states that she fell multiple times at home recently. Patient is extremely anxious and complaining of cough and shortness of breath. Patient states that she is compliant with her medication

## 2021-03-11 NOTE — PLAN
[FreeTextEntry1] : Blood and urine tests sent to the lab \par \par Meclizine 12.5mg PO q 8hrs prn dizziness\par RTO if symptoms persist

## 2021-03-11 NOTE — PHYSICAL EXAM
[No Acute Distress] : no acute distress [Well Nourished] : well nourished [Well Developed] : well developed [Well-Appearing] : well-appearing [PERRL] : pupils equal round and reactive to light [EOMI] : extraocular movements intact [Normal Outer Ear/Nose] : the outer ears and nose were normal in appearance [Normal Oropharynx] : the oropharynx was normal [No JVD] : no jugular venous distention [No Lymphadenopathy] : no lymphadenopathy [Supple] : supple [Thyroid Normal, No Nodules] : the thyroid was normal and there were no nodules present [No Respiratory Distress] : no respiratory distress  [No Accessory Muscle Use] : no accessory muscle use [Clear to Auscultation] : lungs were clear to auscultation bilaterally [Normal Rate] : normal rate  [Regular Rhythm] : with a regular rhythm [Normal S1, S2] : normal S1 and S2 [No Murmur] : no murmur heard [No Carotid Bruits] : no carotid bruits [No Abdominal Bruit] : a ~M bruit was not heard ~T in the abdomen [No Varicosities] : no varicosities [Pedal Pulses Present] : the pedal pulses are present [No Edema] : there was no peripheral edema [No Palpable Aorta] : no palpable aorta [No Extremity Clubbing/Cyanosis] : no extremity clubbing/cyanosis [Soft] : abdomen soft [Non Tender] : non-tender [Non-distended] : non-distended [No Masses] : no abdominal mass palpated [No HSM] : no HSM [Normal Bowel Sounds] : normal bowel sounds [Normal Posterior Cervical Nodes] : no posterior cervical lymphadenopathy [Normal Anterior Cervical Nodes] : no anterior cervical lymphadenopathy [No CVA Tenderness] : no CVA  tenderness [No Spinal Tenderness] : no spinal tenderness [No Joint Swelling] : no joint swelling [Grossly Normal Strength/Tone] : grossly normal strength/tone [No Rash] : no rash [Coordination Grossly Intact] : coordination grossly intact [No Focal Deficits] : no focal deficits [Normal Gait] : normal gait [Deep Tendon Reflexes (DTR)] : deep tendon reflexes were 2+ and symmetric [Normal Affect] : the affect was normal [Normal Insight/Judgement] : insight and judgment were intact [de-identified] : pallor [de-identified] : pale conjunctiva

## 2021-03-11 NOTE — HEALTH RISK ASSESSMENT
[Never (0 pts)] : Never (0 points) [No] : In the past 12 months have you used drugs other than those required for medical reasons? No [Two or more falls in past year] : Patient reported two or more falls in the past year [Assistive Device] : Patient uses an assistive device [0] : 2) Feeling down, depressed, or hopeless: Not at all (0) [] : No [Audit-CScore] : 0 [de-identified] : sedentary [de-identified] : regular [de-identified] : walking cane [[id number]] : 0

## 2021-03-11 NOTE — HISTORY OF PRESENT ILLNESS
[FreeTextEntry1] : Follow up HLD, HTN, CAD, anemia [de-identified] : DIAZ PIRES is a 80 year old female patient with a PMHx of anemia, COPD, depression, anxiety, HLD, HTN, and CAD who presents today for follow up. In the last two weeks, patient states she fell three times. She relates dizziness.\par \par She was seen by pulmonary today.

## 2021-03-11 NOTE — END OF VISIT
[FreeTextEntry3] : I, Hillary Slaas, personally transcribed these services in the presence of Dr. Fredi Carr MD. I, Dr. Fredi Carr MD, personally performed the services described in this documentation as scribed by Hillary Salas in my presence and it is both accurate and complete.

## 2021-03-15 LAB
25(OH)D3 SERPL-MCNC: 34.7 NG/ML
ALBUMIN SERPL ELPH-MCNC: 4.2 G/DL
ALP BLD-CCNC: 97 U/L
ALT SERPL-CCNC: 8 U/L
ANION GAP SERPL CALC-SCNC: 12 MMOL/L
APPEARANCE: ABNORMAL
AST SERPL-CCNC: 14 U/L
BACTERIA: ABNORMAL
BASOPHILS # BLD AUTO: 0.06 K/UL
BASOPHILS NFR BLD AUTO: 0.5 %
BILIRUB SERPL-MCNC: 0.2 MG/DL
BILIRUBIN URINE: NEGATIVE
BLOOD URINE: NEGATIVE
BUN SERPL-MCNC: 21 MG/DL
CALCIUM SERPL-MCNC: 9.9 MG/DL
CHLORIDE SERPL-SCNC: 104 MMOL/L
CHOLEST SERPL-MCNC: 156 MG/DL
CO2 SERPL-SCNC: 29 MMOL/L
COLOR: YELLOW
CREAT SERPL-MCNC: 1.18 MG/DL
EOSINOPHIL # BLD AUTO: 0.26 K/UL
EOSINOPHIL NFR BLD AUTO: 2 %
GLUCOSE QUALITATIVE U: NEGATIVE
GLUCOSE SERPL-MCNC: 105 MG/DL
HCT VFR BLD CALC: 37.4 %
HDLC SERPL-MCNC: 53 MG/DL
HGB BLD-MCNC: 10.9 G/DL
HYALINE CASTS: 3 /LPF
IMM GRANULOCYTES NFR BLD AUTO: 0.4 %
KETONES URINE: NEGATIVE
LDLC SERPL CALC-MCNC: 67 MG/DL
LEUKOCYTE ESTERASE URINE: ABNORMAL
LYMPHOCYTES # BLD AUTO: 2.16 K/UL
LYMPHOCYTES NFR BLD AUTO: 16.7 %
MAN DIFF?: NORMAL
MCHC RBC-ENTMCNC: 27.5 PG
MCHC RBC-ENTMCNC: 29.1 GM/DL
MCV RBC AUTO: 94.2 FL
MICROSCOPIC-UA: NORMAL
MONOCYTES # BLD AUTO: 0.67 K/UL
MONOCYTES NFR BLD AUTO: 5.2 %
NEUTROPHILS # BLD AUTO: 9.71 K/UL
NEUTROPHILS NFR BLD AUTO: 75.2 %
NITRITE URINE: NEGATIVE
NONHDLC SERPL-MCNC: 103 MG/DL
PH URINE: 7
PLATELET # BLD AUTO: 236 K/UL
POTASSIUM SERPL-SCNC: 4.8 MMOL/L
PROT SERPL-MCNC: 7.2 G/DL
PROTEIN URINE: NORMAL
RBC # BLD: 3.97 M/UL
RBC # FLD: 15.6 %
RED BLOOD CELLS URINE: 1 /HPF
SODIUM SERPL-SCNC: 144 MMOL/L
SPECIFIC GRAVITY URINE: 1.02
SQUAMOUS EPITHELIAL CELLS: 5 /HPF
T3 SERPL-MCNC: 121 NG/DL
T4 FREE SERPL-MCNC: 0.9 NG/DL
T4 SERPL-MCNC: 7 UG/DL
TRIGL SERPL-MCNC: 177 MG/DL
TSH SERPL-ACNC: 3.05 UIU/ML
UROBILINOGEN URINE: NORMAL
VIT B12 SERPL-MCNC: 1013 PG/ML
WBC # FLD AUTO: 12.91 K/UL
WHITE BLOOD CELLS URINE: 18 /HPF

## 2021-03-30 ENCOUNTER — RX RENEWAL (OUTPATIENT)
Age: 81
End: 2021-03-30

## 2021-06-07 ENCOUNTER — APPOINTMENT (OUTPATIENT)
Dept: INTERNAL MEDICINE | Facility: CLINIC | Age: 81
End: 2021-06-07
Payer: MEDICARE

## 2021-06-07 VITALS
TEMPERATURE: 98 F | OXYGEN SATURATION: 92 % | HEART RATE: 103 BPM | WEIGHT: 174 LBS | DIASTOLIC BLOOD PRESSURE: 80 MMHG | RESPIRATION RATE: 14 BRPM | SYSTOLIC BLOOD PRESSURE: 124 MMHG | BODY MASS INDEX: 34.16 KG/M2 | HEIGHT: 60 IN

## 2021-06-07 DIAGNOSIS — R42 DIZZINESS AND GIDDINESS: ICD-10-CM

## 2021-06-07 DIAGNOSIS — R53.81 OTHER MALAISE: ICD-10-CM

## 2021-06-07 DIAGNOSIS — E86.0 DEHYDRATION: ICD-10-CM

## 2021-06-07 PROCEDURE — 36415 COLL VENOUS BLD VENIPUNCTURE: CPT

## 2021-06-07 PROCEDURE — 99214 OFFICE O/P EST MOD 30 MIN: CPT | Mod: 25

## 2021-06-08 LAB
25(OH)D3 SERPL-MCNC: 32.8 NG/ML
ALBUMIN SERPL ELPH-MCNC: 4 G/DL
ALP BLD-CCNC: 93 U/L
ALT SERPL-CCNC: 6 U/L
ANION GAP SERPL CALC-SCNC: 13 MMOL/L
AST SERPL-CCNC: 12 U/L
BASOPHILS # BLD AUTO: 0.07 K/UL
BASOPHILS NFR BLD AUTO: 0.7 %
BILIRUB SERPL-MCNC: 0.2 MG/DL
BUN SERPL-MCNC: 33 MG/DL
CALCIUM SERPL-MCNC: 9.7 MG/DL
CHLORIDE SERPL-SCNC: 105 MMOL/L
CHOLEST SERPL-MCNC: 161 MG/DL
CO2 SERPL-SCNC: 24 MMOL/L
CREAT SERPL-MCNC: 1.7 MG/DL
EOSINOPHIL # BLD AUTO: 0.34 K/UL
EOSINOPHIL NFR BLD AUTO: 3.2 %
GLUCOSE SERPL-MCNC: 128 MG/DL
HCT VFR BLD CALC: 36.5 %
HDLC SERPL-MCNC: 48 MG/DL
HGB BLD-MCNC: 11 G/DL
IMM GRANULOCYTES NFR BLD AUTO: 0.3 %
LDLC SERPL CALC-MCNC: 66 MG/DL
LYMPHOCYTES # BLD AUTO: 2.17 K/UL
LYMPHOCYTES NFR BLD AUTO: 20.4 %
MAN DIFF?: NORMAL
MCHC RBC-ENTMCNC: 28.1 PG
MCHC RBC-ENTMCNC: 30.1 GM/DL
MCV RBC AUTO: 93.4 FL
MONOCYTES # BLD AUTO: 0.69 K/UL
MONOCYTES NFR BLD AUTO: 6.5 %
NEUTROPHILS # BLD AUTO: 7.32 K/UL
NEUTROPHILS NFR BLD AUTO: 68.9 %
NONHDLC SERPL-MCNC: 112 MG/DL
PLATELET # BLD AUTO: 262 K/UL
POTASSIUM SERPL-SCNC: 4.7 MMOL/L
PROT SERPL-MCNC: 7.3 G/DL
RBC # BLD: 3.91 M/UL
RBC # FLD: 15 %
SODIUM SERPL-SCNC: 143 MMOL/L
T3 SERPL-MCNC: 93 NG/DL
T4 FREE SERPL-MCNC: 0.9 NG/DL
T4 SERPL-MCNC: 6.1 UG/DL
TRIGL SERPL-MCNC: 232 MG/DL
TSH SERPL-ACNC: 2.26 UIU/ML
VIT B12 SERPL-MCNC: 875 PG/ML
WBC # FLD AUTO: 10.62 K/UL

## 2021-06-09 PROBLEM — R53.81 MALAISE: Status: ACTIVE | Noted: 2021-06-07

## 2021-06-09 PROBLEM — E86.0 DEHYDRATION: Status: ACTIVE | Noted: 2021-06-07

## 2021-06-09 NOTE — END OF VISIT
[FreeTextEntry3] : I, Christiana Hopkins, personally transcribed these services in the presence of Dr. Fredi Carr MD. I, Dr. Fredi Carr MD, personally performed the services described in this documentation as scribed by Christiana Hopkins in my presence and it is both accurate and complete.\par

## 2021-06-09 NOTE — HISTORY OF PRESENT ILLNESS
[FreeTextEntry1] : Follow-up\par  [de-identified] : DIAZ PIRES is a 80 year old female with a PMHx of anemia, GERD, COPD, depression with anxiety, dyslipidemia, and HTN who presents today for follow-up. She relates generalized malaise and difficulty walking\par \par She was seen by cardiologist and all tests were WNL as per pt.

## 2021-06-09 NOTE — REVIEW OF SYSTEMS
[Negative] : Heme/Lymph [Fatigue] : fatigue [FreeTextEntry2] : malaise [FreeTextEntry9] : difficulty walking

## 2021-07-16 ENCOUNTER — RX RENEWAL (OUTPATIENT)
Age: 81
End: 2021-07-16

## 2021-08-03 ENCOUNTER — RX RENEWAL (OUTPATIENT)
Age: 81
End: 2021-08-03

## 2021-08-03 RX ORDER — PANTOPRAZOLE 40 MG/1
40 TABLET, DELAYED RELEASE ORAL DAILY
Qty: 90 | Refills: 0 | Status: ACTIVE | COMMUNITY
Start: 2019-07-29 | End: 1900-01-01

## 2021-10-04 ENCOUNTER — APPOINTMENT (OUTPATIENT)
Dept: INTERNAL MEDICINE | Facility: CLINIC | Age: 81
End: 2021-10-04
Payer: MEDICARE

## 2021-10-04 VITALS
RESPIRATION RATE: 14 BRPM | HEART RATE: 98 BPM | DIASTOLIC BLOOD PRESSURE: 80 MMHG | SYSTOLIC BLOOD PRESSURE: 144 MMHG | WEIGHT: 165 LBS | HEIGHT: 60 IN | OXYGEN SATURATION: 93 % | BODY MASS INDEX: 32.39 KG/M2 | TEMPERATURE: 97.5 F

## 2021-10-04 PROCEDURE — 36415 COLL VENOUS BLD VENIPUNCTURE: CPT

## 2021-10-04 PROCEDURE — 99214 OFFICE O/P EST MOD 30 MIN: CPT | Mod: 25

## 2021-10-07 NOTE — REVIEW OF SYSTEMS
[Negative] : Musculoskeletal [FreeTextEntry9] : difficulty walking [FreeTextEntry1] : difficulty walking

## 2021-10-07 NOTE — END OF VISIT
[FreeTextEntry3] : I, Christiana Hopkins, personally transcribed these services in the presence of Dr. Fredi Carr MD. I, Dr. Fredi Carr MD, personally performed the services described in this documentation as scribed by Christiana Hopkins in my presence and it is both accurate and complete.

## 2021-10-07 NOTE — HISTORY OF PRESENT ILLNESS
[FreeTextEntry1] : Follow-up  [de-identified] : DIAZ PIRES is a 80 year old female with a PMHx of anemia, CAD, chronic GERD, COPD, depression with anxiety, dyslipidemia, and HTN who presents today for follow-up. She was seen by nephrologist and was advised to have blood tests done. Pt also relates difficulty walking due to her arthritis

## 2021-10-07 NOTE — PLAN
[FreeTextEntry1] : Blood tests and urine sent to the lab\par \par Continue same medications\par \par Cardiology evaluation as per scheduled appointment\par \par Neurology evaluation. pt. declines for now

## 2021-10-07 NOTE — HEALTH RISK ASSESSMENT
[Never (0 pts)] : Never (0 points) [No] : In the past 12 months have you used drugs other than those required for medical reasons? No [Two or more falls in past year] : Patient reported two or more falls in the past year [Assistive Device] : Patient uses an assistive device [0] : 2) Feeling down, depressed, or hopeless: Not at all (0) [] : No [de-identified] : sedentary [Audit-CScore] : 0 [de-identified] : regular [de-identified] : walking cane [[id number]] : 0

## 2021-10-07 NOTE — PHYSICAL EXAM
[No Acute Distress] : no acute distress [Well Nourished] : well nourished [Well Developed] : well developed [Well-Appearing] : well-appearing [Normal Sclera/Conjunctiva] : normal sclera/conjunctiva [PERRL] : pupils equal round and reactive to light [EOMI] : extraocular movements intact [Normal Outer Ear/Nose] : the outer ears and nose were normal in appearance [Normal Oropharynx] : the oropharynx was normal [No JVD] : no jugular venous distention [No Lymphadenopathy] : no lymphadenopathy [Supple] : supple [Thyroid Normal, No Nodules] : the thyroid was normal and there were no nodules present [No Respiratory Distress] : no respiratory distress  [No Accessory Muscle Use] : no accessory muscle use [Clear to Auscultation] : lungs were clear to auscultation bilaterally [Normal Rate] : normal rate  [Regular Rhythm] : with a regular rhythm [Normal S1, S2] : normal S1 and S2 [No Murmur] : no murmur heard [No Carotid Bruits] : no carotid bruits [No Abdominal Bruit] : a ~M bruit was not heard ~T in the abdomen [No Varicosities] : no varicosities [Pedal Pulses Present] : the pedal pulses are present [No Edema] : there was no peripheral edema [No Palpable Aorta] : no palpable aorta [No Extremity Clubbing/Cyanosis] : no extremity clubbing/cyanosis [Normal Appearance] : normal in appearance [No Nipple Discharge] : no nipple discharge [No Axillary Lymphadenopathy] : no axillary lymphadenopathy [Soft] : abdomen soft [Non Tender] : non-tender [Non-distended] : non-distended [No Masses] : no abdominal mass palpated [No HSM] : no HSM [Normal Bowel Sounds] : normal bowel sounds [Normal Posterior Cervical Nodes] : no posterior cervical lymphadenopathy [Normal Anterior Cervical Nodes] : no anterior cervical lymphadenopathy [No CVA Tenderness] : no CVA  tenderness [No Spinal Tenderness] : no spinal tenderness [No Joint Swelling] : no joint swelling [Grossly Normal Strength/Tone] : grossly normal strength/tone [No Rash] : no rash [Coordination Grossly Intact] : coordination grossly intact [No Focal Deficits] : no focal deficits [Normal Gait] : normal gait [Deep Tendon Reflexes (DTR)] : deep tendon reflexes were 2+ and symmetric [Normal Affect] : the affect was normal [Normal Insight/Judgement] : insight and judgment were intact [de-identified] : resting tremors both hands [FreeTextEntry1] : deferred

## 2021-10-13 LAB
ALBUMIN SERPL ELPH-MCNC: 4 G/DL
ALP BLD-CCNC: 91 U/L
ALT SERPL-CCNC: 10 U/L
ANION GAP SERPL CALC-SCNC: 17 MMOL/L
APPEARANCE: ABNORMAL
AST SERPL-CCNC: 17 U/L
BACTERIA: ABNORMAL
BILIRUB SERPL-MCNC: 0.2 MG/DL
BILIRUBIN URINE: NEGATIVE
BLOOD URINE: NEGATIVE
BUN SERPL-MCNC: 15 MG/DL
CALCIUM SERPL-MCNC: 9.7 MG/DL
CHLORIDE SERPL-SCNC: 108 MMOL/L
CHOLEST SERPL-MCNC: 157 MG/DL
CO2 SERPL-SCNC: 22 MMOL/L
COLOR: YELLOW
CREAT SERPL-MCNC: 1.35 MG/DL
ESTIMATED AVERAGE GLUCOSE: 120 MG/DL
GLUCOSE QUALITATIVE U: NEGATIVE
GLUCOSE SERPL-MCNC: 113 MG/DL
HBA1C MFR BLD HPLC: 5.8 %
HDLC SERPL-MCNC: 41 MG/DL
HYALINE CASTS: 0 /LPF
KETONES URINE: NEGATIVE
LDLC SERPL CALC-MCNC: 69 MG/DL
LEUKOCYTE ESTERASE URINE: ABNORMAL
MICROSCOPIC-UA: NORMAL
NITRITE URINE: POSITIVE
NONHDLC SERPL-MCNC: 116 MG/DL
PH URINE: 5.5
POTASSIUM SERPL-SCNC: 4.4 MMOL/L
PROT SERPL-MCNC: 7 G/DL
PROTEIN URINE: ABNORMAL
RED BLOOD CELLS URINE: 2 /HPF
SODIUM SERPL-SCNC: 146 MMOL/L
SPECIFIC GRAVITY URINE: 1.02
SQUAMOUS EPITHELIAL CELLS: 8 /HPF
T3 SERPL-MCNC: 112 NG/DL
T3FREE SERPL-MCNC: 2.7 PG/ML
T4 FREE SERPL-MCNC: 1 NG/DL
T4 SERPL-MCNC: 7 UG/DL
TRIGL SERPL-MCNC: 236 MG/DL
TSH SERPL-ACNC: 2.58 UIU/ML
URATE SERPL-MCNC: 7.4 MG/DL
UROBILINOGEN URINE: NORMAL
WHITE BLOOD CELLS URINE: 46 /HPF

## 2021-12-06 LAB
BASOPHILS # BLD AUTO: 0.07 K/UL
BASOPHILS NFR BLD AUTO: 0.7 %
EOSINOPHIL # BLD AUTO: 0.34 K/UL
EOSINOPHIL NFR BLD AUTO: 3.3 %
HCT VFR BLD CALC: 38.2 %
HGB BLD-MCNC: 11.5 G/DL
IMM GRANULOCYTES NFR BLD AUTO: 0.4 %
LYMPHOCYTES # BLD AUTO: 2.39 K/UL
LYMPHOCYTES NFR BLD AUTO: 23 %
MAN DIFF?: NORMAL
MCHC RBC-ENTMCNC: 28.8 PG
MCHC RBC-ENTMCNC: 30.1 GM/DL
MCV RBC AUTO: 95.7 FL
MONOCYTES # BLD AUTO: 0.61 K/UL
MONOCYTES NFR BLD AUTO: 5.9 %
NEUTROPHILS # BLD AUTO: 6.96 K/UL
NEUTROPHILS NFR BLD AUTO: 66.7 %
PLATELET # BLD AUTO: 283 K/UL
RBC # BLD: 3.99 M/UL
RBC # FLD: 14.8 %
WBC # FLD AUTO: 10.41 K/UL

## 2022-01-13 ENCOUNTER — APPOINTMENT (OUTPATIENT)
Dept: INTERNAL MEDICINE | Facility: CLINIC | Age: 82
End: 2022-01-13
Payer: MEDICARE

## 2022-01-13 ENCOUNTER — NON-APPOINTMENT (OUTPATIENT)
Age: 82
End: 2022-01-13

## 2022-01-13 VITALS
BODY MASS INDEX: 32.39 KG/M2 | WEIGHT: 165 LBS | SYSTOLIC BLOOD PRESSURE: 182 MMHG | TEMPERATURE: 97.9 F | DIASTOLIC BLOOD PRESSURE: 80 MMHG | HEART RATE: 101 BPM | OXYGEN SATURATION: 95 % | HEIGHT: 60 IN

## 2022-01-13 DIAGNOSIS — L30.9 DERMATITIS, UNSPECIFIED: ICD-10-CM

## 2022-01-13 DIAGNOSIS — Z00.00 ENCOUNTER FOR GENERAL ADULT MEDICAL EXAMINATION W/OUT ABNORMAL FINDINGS: ICD-10-CM

## 2022-01-13 DIAGNOSIS — G25.2 OTHER SPECIFIED FORMS OF TREMOR: ICD-10-CM

## 2022-01-13 PROCEDURE — G0439: CPT

## 2022-01-13 PROCEDURE — 36415 COLL VENOUS BLD VENIPUNCTURE: CPT

## 2022-01-13 PROCEDURE — 93000 ELECTROCARDIOGRAM COMPLETE: CPT | Mod: 59

## 2022-01-13 RX ORDER — CLOTRIMAZOLE AND BETAMETHASONE DIPROPIONATE 10; .5 MG/G; MG/G
1-0.05 CREAM TOPICAL TWICE DAILY
Qty: 1 | Refills: 3 | Status: ACTIVE | COMMUNITY
Start: 2022-01-13 | End: 1900-01-01

## 2022-01-15 ENCOUNTER — NON-APPOINTMENT (OUTPATIENT)
Age: 82
End: 2022-01-15

## 2022-01-15 VITALS — DIASTOLIC BLOOD PRESSURE: 90 MMHG | SYSTOLIC BLOOD PRESSURE: 158 MMHG

## 2022-01-15 NOTE — PHYSICAL EXAM
[No Acute Distress] : no acute distress [Well Nourished] : well nourished [Well Developed] : well developed [Well-Appearing] : well-appearing [Normal Sclera/Conjunctiva] : normal sclera/conjunctiva [PERRL] : pupils equal round and reactive to light [EOMI] : extraocular movements intact [Normal Outer Ear/Nose] : the outer ears and nose were normal in appearance [Normal Oropharynx] : the oropharynx was normal [No JVD] : no jugular venous distention [No Lymphadenopathy] : no lymphadenopathy [Supple] : supple [Thyroid Normal, No Nodules] : the thyroid was normal and there were no nodules present [No Respiratory Distress] : no respiratory distress  [No Accessory Muscle Use] : no accessory muscle use [Clear to Auscultation] : lungs were clear to auscultation bilaterally [Normal Rate] : normal rate  [Regular Rhythm] : with a regular rhythm [Normal S1, S2] : normal S1 and S2 [No Murmur] : no murmur heard [No Carotid Bruits] : no carotid bruits [No Abdominal Bruit] : a ~M bruit was not heard ~T in the abdomen [No Varicosities] : no varicosities [Pedal Pulses Present] : the pedal pulses are present [No Edema] : there was no peripheral edema [No Palpable Aorta] : no palpable aorta [No Extremity Clubbing/Cyanosis] : no extremity clubbing/cyanosis [Normal Appearance] : normal in appearance [No Nipple Discharge] : no nipple discharge [No Axillary Lymphadenopathy] : no axillary lymphadenopathy [Soft] : abdomen soft [Non Tender] : non-tender [Non-distended] : non-distended [No Masses] : no abdominal mass palpated [No HSM] : no HSM [Normal Bowel Sounds] : normal bowel sounds [Normal Posterior Cervical Nodes] : no posterior cervical lymphadenopathy [Normal Anterior Cervical Nodes] : no anterior cervical lymphadenopathy [No CVA Tenderness] : no CVA  tenderness [No Spinal Tenderness] : no spinal tenderness [No Joint Swelling] : no joint swelling [Grossly Normal Strength/Tone] : grossly normal strength/tone [No Rash] : no rash [Coordination Grossly Intact] : coordination grossly intact [No Focal Deficits] : no focal deficits [Normal Gait] : normal gait [Deep Tendon Reflexes (DTR)] : deep tendon reflexes were 2+ and symmetric [Normal Affect] : the affect was normal [Normal Insight/Judgement] : insight and judgment were intact [de-identified] : obese [FreeTextEntry1] : deferred [de-identified] : markedly erythematous area under both breasts [de-identified] : resting tremor hands

## 2022-01-15 NOTE — HISTORY OF PRESENT ILLNESS
[FreeTextEntry1] : physical examination  [de-identified] : DIAZ PIRES is a 81 year old female with a PMHx of GERD, anemia, COPD, CAD, depression with anxiety, HTN, dyslipidemia, OA, and vertigo who presents today for physical examination. Blood test was requested by her psychiatrist. She is feeling okay and offers no specific complaints.

## 2022-01-15 NOTE — HEALTH RISK ASSESSMENT
[Good] : ~his/her~  mood as  good [Former] : Former [Never (0 pts)] : Never (0 points) [No] : In the past 12 months have you used drugs other than those required for medical reasons? No [Two or more falls in past year] : Patient reported two or more falls in the past year [Assistive Device] : Patient uses an assistive device [0] : 2) Feeling down, depressed, or hopeless: Not at all (0) [Audit-CScore] : 0 [de-identified] : sedentary [de-identified] : regular [de-identified] : walking cane [[id number]] : 0

## 2022-01-26 ENCOUNTER — RX RENEWAL (OUTPATIENT)
Age: 82
End: 2022-01-26

## 2022-02-14 ENCOUNTER — APPOINTMENT (OUTPATIENT)
Dept: INTERNAL MEDICINE | Facility: CLINIC | Age: 82
End: 2022-02-14
Payer: MEDICARE

## 2022-02-14 VITALS
HEART RATE: 79 BPM | HEIGHT: 60 IN | DIASTOLIC BLOOD PRESSURE: 87 MMHG | TEMPERATURE: 97.8 F | OXYGEN SATURATION: 98 % | SYSTOLIC BLOOD PRESSURE: 139 MMHG

## 2022-02-14 DIAGNOSIS — E78.5 HYPERLIPIDEMIA, UNSPECIFIED: ICD-10-CM

## 2022-02-14 DIAGNOSIS — K21.9 GASTRO-ESOPHAGEAL REFLUX DISEASE W/OUT ESOPHAGITIS: ICD-10-CM

## 2022-02-14 PROCEDURE — 99214 OFFICE O/P EST MOD 30 MIN: CPT

## 2022-02-17 NOTE — HISTORY OF PRESENT ILLNESS
[FreeTextEntry1] : Follow-up  [de-identified] : DIAZ PIRES is a 81 year old female with a PMHx of anemia, COPD, HLD, chronic GERD, CAD, depression, anxiety, HTN, dyslipidemia, and osteoarthritis who presents today for follow-up. \par \par Pt was hospitalized at [facility] for SOB  and was found to have COVID-19. Pt was discharged form the hospital one week ago.  She is feeling okay and offers no specific complaints.\par

## 2022-02-17 NOTE — HEALTH RISK ASSESSMENT
[Former] : Former [Never (0 pts)] : Never (0 points) [No] : In the past 12 months have you used drugs other than those required for medical reasons? No [Two or more falls in past year] : Patient reported two or more falls in the past year [Assistive Device] : Patient uses an assistive device [0] : 2) Feeling down, depressed, or hopeless: Not at all (0) [Good] : ~his/her~  mood as  good [Audit-CScore] : 0 [de-identified] : sedentary [de-identified] : regular [de-identified] : walking cane [[id number]] : 0

## 2022-02-17 NOTE — PHYSICAL EXAM
[No Acute Distress] : no acute distress [Well Nourished] : well nourished [Well Developed] : well developed [Well-Appearing] : well-appearing [Normal Sclera/Conjunctiva] : normal sclera/conjunctiva [PERRL] : pupils equal round and reactive to light [EOMI] : extraocular movements intact [Normal Outer Ear/Nose] : the outer ears and nose were normal in appearance [Normal Oropharynx] : the oropharynx was normal [No JVD] : no jugular venous distention [No Lymphadenopathy] : no lymphadenopathy [Supple] : supple [Thyroid Normal, No Nodules] : the thyroid was normal and there were no nodules present [No Respiratory Distress] : no respiratory distress  [No Accessory Muscle Use] : no accessory muscle use [Clear to Auscultation] : lungs were clear to auscultation bilaterally [Normal Rate] : normal rate  [Regular Rhythm] : with a regular rhythm [Normal S1, S2] : normal S1 and S2 [No Murmur] : no murmur heard [No Carotid Bruits] : no carotid bruits [No Abdominal Bruit] : a ~M bruit was not heard ~T in the abdomen [No Varicosities] : no varicosities [Pedal Pulses Present] : the pedal pulses are present [No Edema] : there was no peripheral edema [No Palpable Aorta] : no palpable aorta [No Extremity Clubbing/Cyanosis] : no extremity clubbing/cyanosis [Soft] : abdomen soft [Non Tender] : non-tender [Non-distended] : non-distended [No Masses] : no abdominal mass palpated [No HSM] : no HSM [Normal Bowel Sounds] : normal bowel sounds [Normal Posterior Cervical Nodes] : no posterior cervical lymphadenopathy [Normal Anterior Cervical Nodes] : no anterior cervical lymphadenopathy [No CVA Tenderness] : no CVA  tenderness [No Spinal Tenderness] : no spinal tenderness [No Joint Swelling] : no joint swelling [Grossly Normal Strength/Tone] : grossly normal strength/tone [No Rash] : no rash [Coordination Grossly Intact] : coordination grossly intact [No Focal Deficits] : no focal deficits [Normal Gait] : normal gait [Deep Tendon Reflexes (DTR)] : deep tendon reflexes were 2+ and symmetric [Normal Affect] : the affect was normal [Normal Insight/Judgement] : insight and judgment were intact [de-identified] : O2 nasal canula in place [de-identified] : deferred [FreeTextEntry1] : deferred [de-identified] : pt is on wheelchair

## 2022-02-17 NOTE — END OF VISIT
[FreeTextEntry3] : I, Marla Adame, personally transcribed these services in the presence of Dr. Fredi Carr MD. I, Dr. Fredi Carr MD, personally performed the services described in this documentation as scribed by Marla Adame in my presence and it is both accurate and complete.

## 2022-02-18 LAB
25(OH)D3 SERPL-MCNC: 45.5 NG/ML
ALBUMIN SERPL ELPH-MCNC: 4.2 G/DL
ALP BLD-CCNC: 103 U/L
ALT SERPL-CCNC: 10 U/L
ANION GAP SERPL CALC-SCNC: 15 MMOL/L
APPEARANCE: ABNORMAL
AST SERPL-CCNC: 16 U/L
BACTERIA: ABNORMAL
BASOPHILS # BLD AUTO: 0.06 K/UL
BASOPHILS NFR BLD AUTO: 0.7 %
BILIRUB SERPL-MCNC: 0.2 MG/DL
BILIRUBIN URINE: NEGATIVE
BLOOD URINE: NEGATIVE
BUN SERPL-MCNC: 24 MG/DL
CALCIUM SERPL-MCNC: 9.9 MG/DL
CHLORIDE SERPL-SCNC: 105 MMOL/L
CHOLEST SERPL-MCNC: 137 MG/DL
CO2 SERPL-SCNC: 24 MMOL/L
COLOR: NORMAL
CREAT SERPL-MCNC: 1.53 MG/DL
EOSINOPHIL # BLD AUTO: 0.23 K/UL
EOSINOPHIL NFR BLD AUTO: 2.8 %
GLUCOSE QUALITATIVE U: NEGATIVE
GLUCOSE SERPL-MCNC: 113 MG/DL
HCT VFR BLD CALC: 38.2 %
HDLC SERPL-MCNC: 45 MG/DL
HGB BLD-MCNC: 11.3 G/DL
HYALINE CASTS: 3 /LPF
IMM GRANULOCYTES NFR BLD AUTO: 0.5 %
KETONES URINE: NEGATIVE
LDLC SERPL CALC-MCNC: 51 MG/DL
LEUKOCYTE ESTERASE URINE: NEGATIVE
LYMPHOCYTES # BLD AUTO: 0.86 K/UL
LYMPHOCYTES NFR BLD AUTO: 10.3 %
MAN DIFF?: NORMAL
MCHC RBC-ENTMCNC: 28 PG
MCHC RBC-ENTMCNC: 29.6 GM/DL
MCV RBC AUTO: 94.6 FL
MICROSCOPIC-UA: NORMAL
MONOCYTES # BLD AUTO: 0.75 K/UL
MONOCYTES NFR BLD AUTO: 9 %
NEUTROPHILS # BLD AUTO: 6.41 K/UL
NEUTROPHILS NFR BLD AUTO: 76.7 %
NITRITE URINE: POSITIVE
NONHDLC SERPL-MCNC: 92 MG/DL
PH URINE: 5.5
PLATELET # BLD AUTO: 194 K/UL
POTASSIUM SERPL-SCNC: 4.9 MMOL/L
PROT SERPL-MCNC: 7.4 G/DL
PROTEIN URINE: NEGATIVE
RBC # BLD: 4.04 M/UL
RBC # FLD: 14.5 %
RED BLOOD CELLS URINE: 14 /HPF
SODIUM SERPL-SCNC: 143 MMOL/L
SPECIFIC GRAVITY URINE: 1.02
SQUAMOUS EPITHELIAL CELLS: 9 /HPF
T3 SERPL-MCNC: 120 NG/DL
T4 FREE SERPL-MCNC: 0.9 NG/DL
T4 SERPL-MCNC: 6.5 UG/DL
TRIGL SERPL-MCNC: 204 MG/DL
TSH SERPL-ACNC: 2.65 UIU/ML
UROBILINOGEN URINE: NORMAL
WBC # FLD AUTO: 8.35 K/UL
WHITE BLOOD CELLS URINE: 8 /HPF

## 2022-05-13 ENCOUNTER — APPOINTMENT (OUTPATIENT)
Dept: INTERNAL MEDICINE | Facility: CLINIC | Age: 82
End: 2022-05-13
Payer: MEDICARE

## 2022-05-13 VITALS
RESPIRATION RATE: 16 BRPM | WEIGHT: 165 LBS | DIASTOLIC BLOOD PRESSURE: 75 MMHG | HEIGHT: 60 IN | BODY MASS INDEX: 32.39 KG/M2 | OXYGEN SATURATION: 93 % | HEART RATE: 94 BPM | SYSTOLIC BLOOD PRESSURE: 140 MMHG | TEMPERATURE: 97.5 F

## 2022-05-13 PROCEDURE — 99214 OFFICE O/P EST MOD 30 MIN: CPT | Mod: 25

## 2022-05-13 PROCEDURE — 36415 COLL VENOUS BLD VENIPUNCTURE: CPT

## 2022-05-15 LAB
BASOPHILS # BLD AUTO: 0.05 K/UL
BASOPHILS NFR BLD AUTO: 0.5 %
EOSINOPHIL # BLD AUTO: 0.23 K/UL
EOSINOPHIL NFR BLD AUTO: 2.5 %
ESTIMATED AVERAGE GLUCOSE: 114 MG/DL
GLUCOSE BS SERPL-MCNC: 108 MG/DL
HBA1C MFR BLD HPLC: 5.6 %
HCT VFR BLD CALC: 34.2 %
HGB BLD-MCNC: 10 G/DL
IMM GRANULOCYTES NFR BLD AUTO: 0.4 %
LYMPHOCYTES # BLD AUTO: 2.11 K/UL
LYMPHOCYTES NFR BLD AUTO: 22.5 %
MAN DIFF?: NORMAL
MCHC RBC-ENTMCNC: 27.2 PG
MCHC RBC-ENTMCNC: 29.2 GM/DL
MCV RBC AUTO: 92.9 FL
MONOCYTES # BLD AUTO: 0.49 K/UL
MONOCYTES NFR BLD AUTO: 5.2 %
NEUTROPHILS # BLD AUTO: 6.46 K/UL
NEUTROPHILS NFR BLD AUTO: 68.9 %
PLATELET # BLD AUTO: 203 K/UL
RBC # BLD: 3.68 M/UL
RBC # FLD: 14.1 %
WBC # FLD AUTO: 9.38 K/UL

## 2022-05-16 LAB
ALBUMIN SERPL ELPH-MCNC: 4.1 G/DL
ALP BLD-CCNC: 92 U/L
ALT SERPL-CCNC: 8 U/L
ANION GAP SERPL CALC-SCNC: 13 MMOL/L
AST SERPL-CCNC: 15 U/L
BILIRUB SERPL-MCNC: 0.2 MG/DL
BUN SERPL-MCNC: 17 MG/DL
CALCIUM SERPL-MCNC: 9.7 MG/DL
CHLORIDE SERPL-SCNC: 105 MMOL/L
CO2 SERPL-SCNC: 28 MMOL/L
CREAT SERPL-MCNC: 1.14 MG/DL
EGFR: 48 ML/MIN/1.73M2
GLUCOSE SERPL-MCNC: 108 MG/DL
POTASSIUM SERPL-SCNC: 4.7 MMOL/L
PROT SERPL-MCNC: 7.1 G/DL
SODIUM SERPL-SCNC: 146 MMOL/L

## 2022-05-17 ENCOUNTER — APPOINTMENT (OUTPATIENT)
Dept: HEART AND VASCULAR | Facility: CLINIC | Age: 82
End: 2022-05-17
Payer: MEDICARE

## 2022-05-17 PROCEDURE — 36415 COLL VENOUS BLD VENIPUNCTURE: CPT

## 2022-05-17 NOTE — HISTORY OF PRESENT ILLNESS
[FreeTextEntry1] : follow-up  [de-identified] : DIAZ PIRES is a 81 year old female with a PMHx of anemia, GERD, COPD, depression with anxiety, CAD, HLD, HTN, OA, and vertigo who presents today for follow-up. She is feeling okay however she states that she has difficulty walking without her cane.

## 2022-05-17 NOTE — PHYSICAL EXAM
[No Acute Distress] : no acute distress [Well Nourished] : well nourished [Well Developed] : well developed [Well-Appearing] : well-appearing [Normal Sclera/Conjunctiva] : normal sclera/conjunctiva [PERRL] : pupils equal round and reactive to light [EOMI] : extraocular movements intact [Normal Outer Ear/Nose] : the outer ears and nose were normal in appearance [Normal Oropharynx] : the oropharynx was normal [No JVD] : no jugular venous distention [No Lymphadenopathy] : no lymphadenopathy [Supple] : supple [Thyroid Normal, No Nodules] : the thyroid was normal and there were no nodules present [No Respiratory Distress] : no respiratory distress  [No Accessory Muscle Use] : no accessory muscle use [Clear to Auscultation] : lungs were clear to auscultation bilaterally [Normal Rate] : normal rate  [Regular Rhythm] : with a regular rhythm [Normal S1, S2] : normal S1 and S2 [No Murmur] : no murmur heard [No Carotid Bruits] : no carotid bruits [No Abdominal Bruit] : a ~M bruit was not heard ~T in the abdomen [No Varicosities] : no varicosities [Pedal Pulses Present] : the pedal pulses are present [No Edema] : there was no peripheral edema [No Palpable Aorta] : no palpable aorta [No Extremity Clubbing/Cyanosis] : no extremity clubbing/cyanosis [Soft] : abdomen soft [Non Tender] : non-tender [Non-distended] : non-distended [No Masses] : no abdominal mass palpated [No HSM] : no HSM [Normal Bowel Sounds] : normal bowel sounds [Normal Posterior Cervical Nodes] : no posterior cervical lymphadenopathy [Normal Anterior Cervical Nodes] : no anterior cervical lymphadenopathy [No CVA Tenderness] : no CVA  tenderness [No Spinal Tenderness] : no spinal tenderness [No Joint Swelling] : no joint swelling [Grossly Normal Strength/Tone] : grossly normal strength/tone [No Rash] : no rash [Coordination Grossly Intact] : coordination grossly intact [No Focal Deficits] : no focal deficits [Normal Gait] : normal gait [Deep Tendon Reflexes (DTR)] : deep tendon reflexes were 2+ and symmetric [Normal Affect] : the affect was normal [Normal Insight/Judgement] : insight and judgment were intact [de-identified] : obese [de-identified] : deferred [FreeTextEntry1] : deferred [de-identified] : arthritic changes of hands

## 2022-05-17 NOTE — HEALTH RISK ASSESSMENT
[Former] : Former [Never (0 pts)] : Never (0 points) [No] : In the past 12 months have you used drugs other than those required for medical reasons? No [Two or more falls in past year] : Patient reported two or more falls in the past year [Assistive Device] : Patient uses an assistive device [0] : 2) Feeling down, depressed, or hopeless: Not at all (0) [Audit-CScore] : 0 [de-identified] : sedentary [de-identified] : regular [de-identified] : wheel chair [[id number]] : 0

## 2022-05-20 LAB
FERRITIN SERPL-MCNC: 273 NG/ML
FOLATE SERPL-MCNC: 6.1 NG/ML
IRON SATN MFR SERPL: 20 %
IRON SERPL-MCNC: 48 UG/DL
TIBC SERPL-MCNC: 239 UG/DL
UIBC SERPL-MCNC: 191 UG/DL
VIT B12 SERPL-MCNC: 638 PG/ML

## 2022-06-05 ENCOUNTER — RX RENEWAL (OUTPATIENT)
Age: 82
End: 2022-06-05

## 2022-06-05 RX ORDER — METOPROLOL SUCCINATE 50 MG/1
50 TABLET, EXTENDED RELEASE ORAL
Qty: 90 | Refills: 0 | Status: ACTIVE | COMMUNITY
Start: 2019-06-11 | End: 1900-01-01

## 2022-07-12 ENCOUNTER — LABORATORY RESULT (OUTPATIENT)
Age: 82
End: 2022-07-12

## 2022-07-12 ENCOUNTER — APPOINTMENT (OUTPATIENT)
Dept: INTERNAL MEDICINE | Facility: CLINIC | Age: 82
End: 2022-07-12

## 2022-07-12 VITALS
TEMPERATURE: 97.5 F | OXYGEN SATURATION: 91 % | DIASTOLIC BLOOD PRESSURE: 78 MMHG | HEART RATE: 92 BPM | RESPIRATION RATE: 14 BRPM | SYSTOLIC BLOOD PRESSURE: 133 MMHG

## 2022-07-12 DIAGNOSIS — R26.2 DIFFICULTY IN WALKING, NOT ELSEWHERE CLASSIFIED: ICD-10-CM

## 2022-07-12 DIAGNOSIS — N28.9 DISORDER OF KIDNEY AND URETER, UNSPECIFIED: ICD-10-CM

## 2022-07-12 PROCEDURE — 99214 OFFICE O/P EST MOD 30 MIN: CPT | Mod: 25

## 2022-07-12 PROCEDURE — 36415 COLL VENOUS BLD VENIPUNCTURE: CPT

## 2022-07-19 NOTE — PHYSICAL EXAM
[No Acute Distress] : no acute distress [Well Nourished] : well nourished [Well Developed] : well developed [Well-Appearing] : well-appearing [Normal Sclera/Conjunctiva] : normal sclera/conjunctiva [PERRL] : pupils equal round and reactive to light [EOMI] : extraocular movements intact [Normal Outer Ear/Nose] : the outer ears and nose were normal in appearance [Normal Oropharynx] : the oropharynx was normal [No JVD] : no jugular venous distention [No Lymphadenopathy] : no lymphadenopathy [Supple] : supple [Thyroid Normal, No Nodules] : the thyroid was normal and there were no nodules present [No Respiratory Distress] : no respiratory distress  [No Accessory Muscle Use] : no accessory muscle use [Clear to Auscultation] : lungs were clear to auscultation bilaterally [Normal Rate] : normal rate  [Regular Rhythm] : with a regular rhythm [Normal S1, S2] : normal S1 and S2 [No Murmur] : no murmur heard [No Carotid Bruits] : no carotid bruits [No Abdominal Bruit] : a ~M bruit was not heard ~T in the abdomen [No Varicosities] : no varicosities [Pedal Pulses Present] : the pedal pulses are present [No Edema] : there was no peripheral edema [No Palpable Aorta] : no palpable aorta [No Extremity Clubbing/Cyanosis] : no extremity clubbing/cyanosis [Soft] : abdomen soft [Non Tender] : non-tender [Non-distended] : non-distended [No Masses] : no abdominal mass palpated [No HSM] : no HSM [Normal Bowel Sounds] : normal bowel sounds [Normal Posterior Cervical Nodes] : no posterior cervical lymphadenopathy [Normal Anterior Cervical Nodes] : no anterior cervical lymphadenopathy [No CVA Tenderness] : no CVA  tenderness [No Spinal Tenderness] : no spinal tenderness [No Joint Swelling] : no joint swelling [Grossly Normal Strength/Tone] : grossly normal strength/tone [No Rash] : no rash [Coordination Grossly Intact] : coordination grossly intact [No Focal Deficits] : no focal deficits [Normal Gait] : normal gait [Deep Tendon Reflexes (DTR)] : deep tendon reflexes were 2+ and symmetric [Normal Affect] : the affect was normal [Normal Insight/Judgement] : insight and judgment were intact [de-identified] : deferred [FreeTextEntry1] : deferred [de-identified] : decreased motor due to arthritis

## 2022-07-19 NOTE — HEALTH RISK ASSESSMENT
[Former] : Former [No] : In the past 12 months have you used drugs other than those required for medical reasons? No [Two or more falls in past year] : Patient reported two or more falls in the past year [Assistive Device] : Patient uses an assistive device [0] : 2) Feeling down, depressed, or hopeless: Not at all (0) [Audit-CScore] : 0 [de-identified] : sedentary [de-identified] : regular [de-identified] : wheel chair [[id number]] : 0

## 2022-07-19 NOTE — HISTORY OF PRESENT ILLNESS
[Family Member] : family member [FreeTextEntry1] : follow-up CAD, HLD, HTN [de-identified] : DIAZ PIRES is a 81 year old female with a PMHx of anemia, GERD, COPD, CAD, depression with anxiety, HLD, HTN, and OA who presents today for follow-up CAD, HLD, HTN. Pt states that she has difficulty waking on her own.

## 2022-07-19 NOTE — PLAN
[FreeTextEntry1] : Blood tests sent to the lab \par \par Continue same medications \par \par RTO in 2 months \par \par Physical therapy

## 2022-10-31 ENCOUNTER — RX RENEWAL (OUTPATIENT)
Age: 82
End: 2022-10-31

## 2022-10-31 RX ORDER — ATORVASTATIN CALCIUM 10 MG/1
10 TABLET, FILM COATED ORAL DAILY
Qty: 90 | Refills: 1 | Status: ACTIVE | COMMUNITY
Start: 2019-07-29 | End: 1900-01-01

## 2022-11-15 ENCOUNTER — APPOINTMENT (OUTPATIENT)
Dept: INTERNAL MEDICINE | Facility: CLINIC | Age: 82
End: 2022-11-15

## 2022-11-15 VITALS
HEART RATE: 77 BPM | OXYGEN SATURATION: 91 % | RESPIRATION RATE: 16 BRPM | TEMPERATURE: 97.8 F | SYSTOLIC BLOOD PRESSURE: 140 MMHG | DIASTOLIC BLOOD PRESSURE: 75 MMHG | HEIGHT: 60 IN

## 2022-11-15 DIAGNOSIS — D63.8 ANEMIA IN OTHER CHRONIC DISEASES CLASSIFIED ELSEWHERE: ICD-10-CM

## 2022-11-15 DIAGNOSIS — D64.9 ANEMIA, UNSPECIFIED: ICD-10-CM

## 2022-11-15 DIAGNOSIS — J44.9 CHRONIC OBSTRUCTIVE PULMONARY DISEASE, UNSPECIFIED: ICD-10-CM

## 2022-11-15 DIAGNOSIS — E78.5 HYPERLIPIDEMIA, UNSPECIFIED: ICD-10-CM

## 2022-11-15 DIAGNOSIS — I10 ESSENTIAL (PRIMARY) HYPERTENSION: ICD-10-CM

## 2022-11-15 DIAGNOSIS — F41.8 OTHER SPECIFIED ANXIETY DISORDERS: ICD-10-CM

## 2022-11-15 DIAGNOSIS — I25.10 ATHEROSCLEROTIC HEART DISEASE OF NATIVE CORONARY ARTERY W/OUT ANGINA PECTORIS: ICD-10-CM

## 2022-11-15 DIAGNOSIS — M19.90 UNSPECIFIED OSTEOARTHRITIS, UNSPECIFIED SITE: ICD-10-CM

## 2022-11-15 PROCEDURE — 99214 OFFICE O/P EST MOD 30 MIN: CPT | Mod: 25

## 2022-11-15 PROCEDURE — 36415 COLL VENOUS BLD VENIPUNCTURE: CPT

## 2022-11-15 NOTE — END OF VISIT
[FreeTextEntry3] : I, Christiana Hopkins, personally transcribed these services in the presence of Dr. Fredi Carr MD.\par I, Dr. Fredi Carr MD, personally performed the services described in this documentation as scribed by Christiana Hopkins in my presence and it is both accurate and complete.

## 2022-11-15 NOTE — PHYSICAL EXAM
[No Acute Distress] : no acute distress [Well Nourished] : well nourished [Well Developed] : well developed [Well-Appearing] : well-appearing [Normal Sclera/Conjunctiva] : normal sclera/conjunctiva [PERRL] : pupils equal round and reactive to light [EOMI] : extraocular movements intact [Normal Outer Ear/Nose] : the outer ears and nose were normal in appearance [Normal Oropharynx] : the oropharynx was normal [No JVD] : no jugular venous distention [No Lymphadenopathy] : no lymphadenopathy [Supple] : supple [Thyroid Normal, No Nodules] : the thyroid was normal and there were no nodules present [No Respiratory Distress] : no respiratory distress  [No Accessory Muscle Use] : no accessory muscle use [Clear to Auscultation] : lungs were clear to auscultation bilaterally [Normal Rate] : normal rate  [Regular Rhythm] : with a regular rhythm [Normal S1, S2] : normal S1 and S2 [No Murmur] : no murmur heard [No Carotid Bruits] : no carotid bruits [No Abdominal Bruit] : a ~M bruit was not heard ~T in the abdomen [No Varicosities] : no varicosities [Pedal Pulses Present] : the pedal pulses are present [No Edema] : there was no peripheral edema [No Palpable Aorta] : no palpable aorta [No Extremity Clubbing/Cyanosis] : no extremity clubbing/cyanosis [Soft] : abdomen soft [Non Tender] : non-tender [Non-distended] : non-distended [No Masses] : no abdominal mass palpated [No HSM] : no HSM [Normal Bowel Sounds] : normal bowel sounds [Normal Posterior Cervical Nodes] : no posterior cervical lymphadenopathy [Normal Anterior Cervical Nodes] : no anterior cervical lymphadenopathy [No CVA Tenderness] : no CVA  tenderness [No Spinal Tenderness] : no spinal tenderness [No Joint Swelling] : no joint swelling [Grossly Normal Strength/Tone] : grossly normal strength/tone [No Rash] : no rash [Coordination Grossly Intact] : coordination grossly intact [No Focal Deficits] : no focal deficits [Normal Gait] : normal gait [Deep Tendon Reflexes (DTR)] : deep tendon reflexes were 2+ and symmetric [Normal Affect] : the affect was normal [Normal Insight/Judgement] : insight and judgment were intact [de-identified] : deferred [FreeTextEntry1] : deferred [de-identified] : resting tremors

## 2022-11-15 NOTE — HEALTH RISK ASSESSMENT
[Former] : Former [No] : In the past 12 months have you used drugs other than those required for medical reasons? No [Two or more falls in past year] : Patient reported two or more falls in the past year [Assistive Device] : Patient uses an assistive device [0] : 2) Feeling down, depressed, or hopeless: Not at all (0) [Audit-CScore] : 0 [de-identified] : sedentary [de-identified] : regular [de-identified] : wheel chair [[id number]] : 0

## 2022-11-15 NOTE — HISTORY OF PRESENT ILLNESS
[FreeTextEntry1] : follow-up  [de-identified] : DIAZ PIRES is a 82 year old female with a PMHx of anemia, GERD, COPD, CAD, depression with anxiety, HLD, HTN, OA, and vertigo who presents today for follow-up. She is feeling okay however she relates difficulty walking. She states that she is walking at home with the walker.

## 2022-11-15 NOTE — PLAN
[FreeTextEntry1] : Blood tests sent to the lab \par \par Continue same medications \par \par Lilly June samples given

## 2022-11-23 LAB
25(OH)D3 SERPL-MCNC: 20.5 NG/ML
ALBUMIN SERPL ELPH-MCNC: 4 G/DL
ALP BLD-CCNC: 86 U/L
ALT SERPL-CCNC: 6 U/L
ANION GAP SERPL CALC-SCNC: 16 MMOL/L
AST SERPL-CCNC: 10 U/L
BASOPHILS # BLD AUTO: 0.07 K/UL
BASOPHILS NFR BLD AUTO: 0.8 %
BILIRUB SERPL-MCNC: 0.2 MG/DL
BUN SERPL-MCNC: 24 MG/DL
CALCIUM SERPL-MCNC: 9.8 MG/DL
CHLORIDE SERPL-SCNC: 105 MMOL/L
CHOLEST SERPL-MCNC: 147 MG/DL
CO2 SERPL-SCNC: 22 MMOL/L
CREAT SERPL-MCNC: 1.25 MG/DL
EGFR: 42 ML/MIN/1.73M2
EOSINOPHIL # BLD AUTO: 0.27 K/UL
EOSINOPHIL NFR BLD AUTO: 3.3 %
ESTIMATED AVERAGE GLUCOSE: 120 MG/DL
GLUCOSE SERPL-MCNC: 106 MG/DL
HBA1C MFR BLD HPLC: 5.8 %
HCT VFR BLD CALC: 35.3 %
HDLC SERPL-MCNC: 53 MG/DL
HGB BLD-MCNC: 10.2 G/DL
IMM GRANULOCYTES NFR BLD AUTO: 0.4 %
LDLC SERPL CALC-MCNC: 62 MG/DL
LYMPHOCYTES # BLD AUTO: 1.71 K/UL
LYMPHOCYTES NFR BLD AUTO: 20.6 %
MAN DIFF?: NORMAL
MCHC RBC-ENTMCNC: 26.8 PG
MCHC RBC-ENTMCNC: 28.9 GM/DL
MCV RBC AUTO: 92.7 FL
MONOCYTES # BLD AUTO: 0.48 K/UL
MONOCYTES NFR BLD AUTO: 5.8 %
NEUTROPHILS # BLD AUTO: 5.73 K/UL
NEUTROPHILS NFR BLD AUTO: 69.1 %
NONHDLC SERPL-MCNC: 95 MG/DL
PLATELET # BLD AUTO: 216 K/UL
POTASSIUM SERPL-SCNC: 4.7 MMOL/L
PROT SERPL-MCNC: 7.1 G/DL
RBC # BLD: 3.81 M/UL
RBC # FLD: 16.6 %
SODIUM SERPL-SCNC: 143 MMOL/L
T3 SERPL-MCNC: 122 NG/DL
T4 FREE SERPL-MCNC: 0.9 NG/DL
T4 SERPL-MCNC: 6.4 UG/DL
TRIGL SERPL-MCNC: 160 MG/DL
TSH SERPL-ACNC: 2.95 UIU/ML
WBC # FLD AUTO: 8.29 K/UL

## 2023-01-13 ENCOUNTER — RX RENEWAL (OUTPATIENT)
Age: 83
End: 2023-01-13

## 2023-01-13 RX ORDER — LISINOPRIL AND HYDROCHLOROTHIAZIDE TABLETS 10; 12.5 MG/1; MG/1
10-12.5 TABLET ORAL
Qty: 90 | Refills: 1 | Status: ACTIVE | COMMUNITY
Start: 2019-06-11 | End: 1900-01-01

## 2023-03-14 ENCOUNTER — APPOINTMENT (OUTPATIENT)
Dept: INTERNAL MEDICINE | Facility: CLINIC | Age: 83
End: 2023-03-14

## 2023-05-03 ENCOUNTER — APPOINTMENT (OUTPATIENT)
Dept: INTERNAL MEDICINE | Facility: CLINIC | Age: 83
End: 2023-05-03